# Patient Record
Sex: MALE | URBAN - METROPOLITAN AREA
[De-identification: names, ages, dates, MRNs, and addresses within clinical notes are randomized per-mention and may not be internally consistent; named-entity substitution may affect disease eponyms.]

---

## 2018-07-23 PROBLEM — 110483000 TOBACCO USE: Status: ACTIVE | Noted: 2018-07-23

## 2018-07-23 PROBLEM — 55822004 HYPERLIPIDEMIA: Status: ACTIVE | Noted: 2018-07-23

## 2018-07-23 PROBLEM — 266435005 GASTRO-ESOPHAGEAL REFLUX DISEASE WITHOUT ESOPHAGITIS: Status: ACTIVE | Noted: 2018-07-23

## 2018-07-23 PROBLEM — 59621000 ESSENTIAL HYPERTENSION: Status: ACTIVE | Noted: 2018-07-23

## 2018-07-23 PROBLEM — 313436004 TYPE II DIABETES MELLITUS WITHOUT COMPLICATION: Status: ACTIVE | Noted: 2018-07-23

## 2018-07-23 PROBLEM — 428283002 HISTORY OF POLYP OF COLON (SITUATION): Status: ACTIVE | Noted: 2018-07-23

## 2022-04-30 ENCOUNTER — TELEPHONE ENCOUNTER (OUTPATIENT)
Dept: URBAN - METROPOLITAN AREA CLINIC 121 | Facility: CLINIC | Age: 72
End: 2022-04-30

## 2022-04-30 RX ORDER — SODIUM SULFATE, POTASSIUM SULFATE, MAGNESIUM SULFATE 17.5; 3.13; 1.6 G/ML; G/ML; G/ML
MIX AND USE AS DIRECTED SOLUTION, CONCENTRATE ORAL
OUTPATIENT
Start: 2018-07-25 | End: 2018-07-31

## 2022-04-30 RX ORDER — SODIUM SULFATE, POTASSIUM SULFATE, MAGNESIUM SULFATE 17.5; 3.13; 1.6 G/ML; G/ML; G/ML
MIX AND USE AS DIRECTED SOLUTION, CONCENTRATE ORAL
OUTPATIENT
Start: 2018-07-25

## 2022-05-01 ENCOUNTER — TELEPHONE ENCOUNTER (OUTPATIENT)
Dept: URBAN - METROPOLITAN AREA CLINIC 121 | Facility: CLINIC | Age: 72
End: 2022-05-01

## 2022-05-01 RX ORDER — LOSARTAN POTASSIUM 100 MG/1
TABLET, FILM COATED ORAL
Status: ACTIVE | COMMUNITY
Start: 2018-07-23

## 2022-05-01 RX ORDER — ESOMEPRAZOLE MAGNESIUM 20 MG
CAPSULE,DELAYED RELEASE (ENTERIC COATED) ORAL
Status: ACTIVE | COMMUNITY
Start: 2018-07-23

## 2022-05-01 RX ORDER — OMEGA-3S/DHA/EPA/FISH OIL 300-1000MG
CAPSULE ORAL
Status: ACTIVE | COMMUNITY
Start: 2018-07-23

## 2022-05-01 RX ORDER — ATORVASTATIN CALCIUM 20 MG/1
TABLET, FILM COATED ORAL
Status: ACTIVE | COMMUNITY
Start: 2018-07-23

## 2022-05-01 RX ORDER — AMLODIPINE BESYLATE 5 MG/1
TABLET ORAL
Status: ACTIVE | COMMUNITY
Start: 2018-07-23

## 2022-05-01 RX ORDER — ASPIRIN 81 MG/1
TABLET, DELAYED RELEASE ORAL
Status: ACTIVE | COMMUNITY
Start: 2018-07-23

## 2022-05-01 RX ORDER — METFORMIN HCL 500 MG/1
TABLET ORAL
Status: ACTIVE | COMMUNITY
Start: 2018-07-23

## 2022-08-23 ENCOUNTER — DASHBOARD ENCOUNTERS (OUTPATIENT)
Age: 72
End: 2022-08-23

## 2022-08-23 ENCOUNTER — LAB OUTSIDE AN ENCOUNTER (OUTPATIENT)
Dept: URBAN - METROPOLITAN AREA CLINIC 27 | Facility: CLINIC | Age: 72
End: 2022-08-23

## 2022-08-23 ENCOUNTER — TELEPHONE ENCOUNTER (OUTPATIENT)
Dept: URBAN - METROPOLITAN AREA CLINIC 27 | Facility: CLINIC | Age: 72
End: 2022-08-23

## 2022-08-23 ENCOUNTER — OFFICE VISIT (OUTPATIENT)
Dept: URBAN - METROPOLITAN AREA CLINIC 27 | Facility: CLINIC | Age: 72
End: 2022-08-23
Payer: MEDICARE

## 2022-08-23 VITALS
SYSTOLIC BLOOD PRESSURE: 148 MMHG | TEMPERATURE: 97.1 F | HEART RATE: 92 BPM | BODY MASS INDEX: 24.14 KG/M2 | DIASTOLIC BLOOD PRESSURE: 96 MMHG | WEIGHT: 163 LBS | HEIGHT: 69 IN

## 2022-08-23 DIAGNOSIS — Z86.010 HX OF ADENOMATOUS POLYP OF COLON: ICD-10-CM

## 2022-08-23 DIAGNOSIS — Z79.82 LONG TERM (CURRENT) USE OF ASPIRIN: ICD-10-CM

## 2022-08-23 DIAGNOSIS — E66.3 OVERWEIGHT: ICD-10-CM

## 2022-08-23 DIAGNOSIS — E11.9 DIABETES MELLITUS: ICD-10-CM

## 2022-08-23 DIAGNOSIS — K21.9 GERD: ICD-10-CM

## 2022-08-23 DIAGNOSIS — Z72.0 TOBACCO ABUSE: ICD-10-CM

## 2022-08-23 DIAGNOSIS — I10 HYPERTENSION: ICD-10-CM

## 2022-08-23 PROBLEM — 38341003 HYPERTENSION: Status: ACTIVE | Noted: 2022-08-23

## 2022-08-23 PROBLEM — 235595009 GASTROESOPHAGEAL REFLUX DISEASE: Status: ACTIVE | Noted: 2022-08-23

## 2022-08-23 PROBLEM — 449681000124101 LONG-TERM CURRENT USE OF ANTIPLATELET DRUG: Status: ACTIVE | Noted: 2018-07-23

## 2022-08-23 PROBLEM — 73211009 DIABETES MELLITUS: Status: ACTIVE | Noted: 2022-08-23

## 2022-08-23 PROCEDURE — 99204 OFFICE O/P NEW MOD 45 MIN: CPT | Performed by: INTERNAL MEDICINE

## 2022-08-23 RX ORDER — OMEGA-3S/DHA/EPA/FISH OIL 300-1000MG
CAPSULE ORAL
Status: ACTIVE | COMMUNITY
Start: 2018-07-23

## 2022-08-23 RX ORDER — ATORVASTATIN CALCIUM 20 MG/1
TABLET, FILM COATED ORAL
Status: ACTIVE | COMMUNITY
Start: 2018-07-23

## 2022-08-23 RX ORDER — LOSARTAN POTASSIUM 100 MG/1
TABLET, FILM COATED ORAL
Status: ACTIVE | COMMUNITY
Start: 2018-07-23

## 2022-08-23 RX ORDER — AMLODIPINE BESYLATE 5 MG/1
TABLET ORAL
Status: ACTIVE | COMMUNITY
Start: 2018-07-23

## 2022-08-23 RX ORDER — METFORMIN HCL 500 MG/1
TABLET ORAL
Status: ACTIVE | COMMUNITY
Start: 2018-07-23

## 2022-08-23 RX ORDER — ESOMEPRAZOLE MAGNESIUM 20 MG
CAPSULE,DELAYED RELEASE (ENTERIC COATED) ORAL
Status: ACTIVE | COMMUNITY
Start: 2018-07-23

## 2022-08-23 RX ORDER — ASPIRIN 81 MG/1
TABLET, DELAYED RELEASE ORAL
Status: ACTIVE | COMMUNITY
Start: 2018-07-23

## 2022-08-23 RX ORDER — POLYETHYLENE GLYCOL-3350 AND ELECTROLYTES 236; 6.74; 5.86; 2.97; 22.74 G/274.31G; G/274.31G; G/274.31G; G/274.31G; G/274.31G
AS DIRECTED POWDER, FOR SOLUTION ORAL ONCE A DAY
Qty: 1 | Refills: 0 | OUTPATIENT
Start: 2022-08-23 | End: 2022-08-24

## 2022-08-23 NOTE — HPI-TODAY'S VISIT:
Patient here at the request of Dr. Ram for repeat colonoscopy. His last colonoscopy was in 2018; one small adenoma was removed. The prep was somewhat suboptimal, and he was supposed to return for a follow up colonoscopy in 2021, but did not do so until now. He currently is doing very well from a GI standpoint. His chronic reflux symptoms (>20y) are well-controlled with OTC Nexium once daily. He is moderately adherent to an anti-reflux regimen. He does smoke 1/2 pack per day. Polyps were apparently removed during a 2013 colonoscopy, and he also underwent an upper endoscopy in 2013; results unknown. There is no family history of colon cancer or polyps.

## 2022-08-24 PROBLEM — 429047008 HISTORY OF ADENOMATOUS POLYP OF COLON: Status: ACTIVE | Noted: 2022-08-23

## 2022-09-12 ENCOUNTER — CLAIMS CREATED FROM THE CLAIM WINDOW (OUTPATIENT)
Dept: URBAN - METROPOLITAN AREA SURGERY CENTER 7 | Facility: SURGERY CENTER | Age: 72
End: 2022-09-12
Payer: MEDICARE

## 2022-09-12 ENCOUNTER — TELEPHONE ENCOUNTER (OUTPATIENT)
Dept: URBAN - METROPOLITAN AREA CLINIC 27 | Facility: CLINIC | Age: 72
End: 2022-09-12

## 2022-09-12 DIAGNOSIS — Z86.010 ADENOMAS PERSONAL HISTORY OF COLONIC POLYPS: ICD-10-CM

## 2022-09-12 DIAGNOSIS — D12.3 ADENOMA OF TRANSVERSE COLON: ICD-10-CM

## 2022-09-12 PROCEDURE — 45380 COLONOSCOPY AND BIOPSY: CPT | Performed by: INTERNAL MEDICINE

## 2022-09-12 PROCEDURE — 45380 COLONOSCOPY AND BIOPSY: CPT | Performed by: CLINIC/CENTER

## 2022-09-12 PROCEDURE — G8907 PT DOC NO EVENTS ON DISCHARG: HCPCS | Performed by: INTERNAL MEDICINE

## 2022-09-12 PROCEDURE — G8907 PT DOC NO EVENTS ON DISCHARG: HCPCS | Performed by: CLINIC/CENTER

## 2022-09-12 RX ORDER — ASPIRIN 81 MG/1
TABLET, DELAYED RELEASE ORAL
Status: ACTIVE | COMMUNITY
Start: 2018-07-23

## 2022-09-12 RX ORDER — LOSARTAN POTASSIUM 100 MG/1
TABLET, FILM COATED ORAL
Status: ACTIVE | COMMUNITY
Start: 2018-07-23

## 2022-09-12 RX ORDER — ATORVASTATIN CALCIUM 20 MG/1
TABLET, FILM COATED ORAL
Status: ACTIVE | COMMUNITY
Start: 2018-07-23

## 2022-09-12 RX ORDER — OMEGA-3S/DHA/EPA/FISH OIL 300-1000MG
CAPSULE ORAL
Status: ACTIVE | COMMUNITY
Start: 2018-07-23

## 2022-09-12 RX ORDER — METFORMIN HCL 500 MG/1
TABLET ORAL
Status: ACTIVE | COMMUNITY
Start: 2018-07-23

## 2022-09-12 RX ORDER — AMLODIPINE BESYLATE 5 MG/1
TABLET ORAL
Status: ACTIVE | COMMUNITY
Start: 2018-07-23

## 2022-09-12 RX ORDER — ESOMEPRAZOLE MAGNESIUM 20 MG
CAPSULE,DELAYED RELEASE (ENTERIC COATED) ORAL
Status: ACTIVE | COMMUNITY
Start: 2018-07-23

## 2023-06-22 ENCOUNTER — TELEPHONE ENCOUNTER (OUTPATIENT)
Dept: URBAN - METROPOLITAN AREA CLINIC 27 | Facility: CLINIC | Age: 73
End: 2023-06-22